# Patient Record
Sex: FEMALE | Race: BLACK OR AFRICAN AMERICAN | NOT HISPANIC OR LATINO | Employment: OTHER | URBAN - METROPOLITAN AREA
[De-identification: names, ages, dates, MRNs, and addresses within clinical notes are randomized per-mention and may not be internally consistent; named-entity substitution may affect disease eponyms.]

---

## 2022-08-31 ENCOUNTER — DOCUMENTATION ONLY (OUTPATIENT)
Dept: REHABILITATION | Facility: HOSPITAL | Age: 62
End: 2022-08-31
Payer: MEDICAID

## 2022-08-31 NOTE — PROGRESS NOTES
Physical Therapy No Show Note       Patient was scheduled for physical therapy initial evaluation at [facility] 8/31/2022. Pt failed to appear for appointment without prior notification for today,. Pt will need to contact the clinic to reschedule evaluation as needed.        Hunter Baker, PT, DPT